# Patient Record
Sex: MALE | Race: WHITE
[De-identification: names, ages, dates, MRNs, and addresses within clinical notes are randomized per-mention and may not be internally consistent; named-entity substitution may affect disease eponyms.]

---

## 2021-09-19 ENCOUNTER — TRANSCRIPTION ENCOUNTER (OUTPATIENT)
Age: 51
End: 2021-09-19

## 2022-07-14 ENCOUNTER — APPOINTMENT (OUTPATIENT)
Dept: ORTHOPEDIC SURGERY | Facility: CLINIC | Age: 52
End: 2022-07-14

## 2022-07-14 VITALS — HEIGHT: 65 IN | WEIGHT: 149 LBS | BODY MASS INDEX: 24.83 KG/M2

## 2022-07-14 DIAGNOSIS — Z78.9 OTHER SPECIFIED HEALTH STATUS: ICD-10-CM

## 2022-07-14 PROBLEM — Z00.00 ENCOUNTER FOR PREVENTIVE HEALTH EXAMINATION: Status: ACTIVE | Noted: 2022-07-14

## 2022-07-14 PROCEDURE — 73562 X-RAY EXAM OF KNEE 3: CPT | Mod: RT

## 2022-07-14 PROCEDURE — 99203 OFFICE O/P NEW LOW 30 MIN: CPT

## 2022-07-14 RX ORDER — NAPROXEN 500 MG/1
500 TABLET ORAL
Qty: 60 | Refills: 0 | Status: ACTIVE | COMMUNITY
Start: 2022-07-14 | End: 1900-01-01

## 2022-07-14 NOTE — HISTORY OF PRESENT ILLNESS
[de-identified] :  patient is a 51-year-old male here for right knee pain.  Patient states 3 days ago  he began feeling discomfort and pain in the back of his knee with no trauma or injury.  States over the past few days the pain has worsened.  Patient states it hurts to stand long periods of time, walk long distances, move in certain positions.  Patient denies instability, denies buckling.  Denies numbness and tingling.

## 2022-07-14 NOTE — DATA REVIEWED
[Right] : of the right [Knee] : knee [FreeTextEntry1] : X-Ray: No acute fracture, subluxation, dislocation or abnormality noted

## 2022-07-14 NOTE — DISCUSSION/SUMMARY
[de-identified] :  discussed x-rays with patient, negative for abnormalities.  Patient has right knee pain with right hamstring strain.  Discussed treatment options patient cleaning rest, anti-inflammatories, range-of-motion exercise, physical therapy, cortisone injection, the sleeve.  Worsened 500 mg b.i.d. sent to patient's pharmacy, discussed side effects.  AAOS knee exercises given..  Follow-up in 6 weeks for re-evaluation.  Call if any questions or concerns.  Patient understands agrees with plan.  Patient seen in the supervision of Dr. Solomon.

## 2022-07-14 NOTE — PHYSICAL EXAM
[Right] : right knee [Negative] : negative anterior draw [] : non-antalgic [FreeTextEntry8] :  mild tenderness to palpation over posterior knee / distal  hamstring insertion\par  palpable distal hamstring\par \par \par  [FreeTextEntry9] :  full flexion-extension with mild pain to posterior knee [de-identified] :  good strength throughout

## 2022-08-26 ENCOUNTER — APPOINTMENT (OUTPATIENT)
Dept: ORTHOPEDIC SURGERY | Facility: CLINIC | Age: 52
End: 2022-08-26

## 2022-08-26 VITALS — HEIGHT: 65 IN | WEIGHT: 148 LBS | BODY MASS INDEX: 24.66 KG/M2

## 2022-08-26 DIAGNOSIS — S76.311A STRAIN OF MUSCLE, FASCIA AND TENDON OF THE POSTERIOR MUSCLE GROUP AT THIGH LEVEL, RIGHT THIGH, INITIAL ENCOUNTER: ICD-10-CM

## 2022-08-26 DIAGNOSIS — Z78.9 OTHER SPECIFIED HEALTH STATUS: ICD-10-CM

## 2022-08-26 DIAGNOSIS — M25.561 PAIN IN RIGHT KNEE: ICD-10-CM

## 2022-08-26 PROCEDURE — 99203 OFFICE O/P NEW LOW 30 MIN: CPT

## 2022-08-26 NOTE — DISCUSSION/SUMMARY
[de-identified] : Patient doing well, fully recovery. Advised patient to continue exercises for the knee. F/U PRN. Seen under supervision of Dr. Buck.